# Patient Record
Sex: MALE | Race: WHITE
[De-identification: names, ages, dates, MRNs, and addresses within clinical notes are randomized per-mention and may not be internally consistent; named-entity substitution may affect disease eponyms.]

---

## 2020-05-23 ENCOUNTER — HOSPITAL ENCOUNTER (EMERGENCY)
Dept: HOSPITAL 9 - MADERS | Age: 35
Discharge: HOME | End: 2020-05-23
Payer: SELF-PAY
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
## 2020-05-23 DIAGNOSIS — E11.9: ICD-10-CM

## 2020-05-23 DIAGNOSIS — S22.32XA: Primary | ICD-10-CM

## 2020-05-23 DIAGNOSIS — Z79.4: ICD-10-CM

## 2020-05-24 NOTE — RAD
LEFT RIBS TWO VIEWS:

PA CHEST X-RAY:

 

HISTORY:

Left rib pain. Hit on a gate.

 

COMPARISON:

None.

 

FINDINGS:

There is linear artifact, limiting evaluation. No acute displaced left-sided rib fracture is apprecia
ryder.

 

No pneumothorax. Thoracic spine vertebral body height appears maintained.

 

IMPRESSION:

Clear lungs. No acute displaced left-sided rib fracture.

 

POS: HOME